# Patient Record
Sex: FEMALE | Race: WHITE
[De-identification: names, ages, dates, MRNs, and addresses within clinical notes are randomized per-mention and may not be internally consistent; named-entity substitution may affect disease eponyms.]

---

## 2017-05-26 NOTE — MH
cc:

ROSLYN OROURKE MD

****

 

DATE OF ADMISSION

2017

 

DATE OF BIRTH

1940

 

REASON FOR ADMISSION

Hysteroscopy, D&C

 

HISTORY OF PRESENT ILLNESS

Patient is a 76-year-old white female  5, para 4 who has had an issue

with breast cancer and was on tamoxifen for three years.  She had an episode of

postmenopausal bleeding and  was found to have a thickened endometrial stripe.

The patient had previous hysteroscopy and polypectomy a few years ago with

benign pathology. Her ultrasound in May of 2017 shows a 9 mm stripe.  The

patient was given options and she has opted for repeat hysteroscopy.

 

PAST MEDICAL HISTORY

1.  Hypertension,

2.  Hypercholesterolemia,

3.  Breast cancer.

4.  History of recurrent bladder infections.

 

PAST SURGICAL HISTORY

1.  Lumpectomy

2.  Knee replacement bilaterally

3.  Hysteroscopy D&C.

 

ALLERGIES

SULFA

 

MEDICATIONS

Bystolic 5 mg daily.

 

GYNECOLOGIC HISTORY

No STDs, abnormal Pap smears.  Bleeding issue as noted above.

 

OBSTETRICAL HISTORY

Four vaginal deliveries.

 

FAMILY HISTORY

Noncontributory.

 

SOCIAL HISTORY

Has good social support.  .  No alcohol or drugs.

 

REVIEW OF SYSTEMS

Negative for chest pain, orthopnea, PND.

 

PHYSICAL EXAMINATION

VITAL SIGNS: She is afebrile.  Vital signs stable. Blood pressure is 120/70,

height is 5 feet 5 inches, weight 145, BMI is 24.  GENERAL:  Patient is alert,

oriented in no acute distress, no sign of cognitive dysfunction or depression.

 

HEENT:  Within normal limits.

NECK: Supple.  No JVD.

CHEST:  Clear.

HEART:  Regular rate and rhythm.

ABDOMEN: Soft, nontender.  No hepatosplenomegaly.  No CVA tenderness.

PELVIC:  Exam will be detailed under anesthesia.

EXTREMITIES:   Normal skin without rashes.

NEUROLOGIC :  Nonfocal.  No DVT signs.

 

ASSESSMENT

Patient with postmenopausal bleeding, thickened endometrial stripe, history of

tamoxifen  use and prior benign polypectomy.

 

The patient  has opted to proceed with repeat hysteroscopy. She is aware of the

risks, benefits and alternatives to planned procedure including damage to

surrounding organs, bleeding, infection, need for further surgery.

 

The patient will use DVT prophylaxis with Ancef 2 grams and she will use DVT

prophylaxis with sequential compression device.  Anticipate outpatient

procedure.

 

 

                               __________________________________

                           ChristophMD SEE Zaidi/

D:  2017/5:03 PM

T:  2017/8:16 AM

Visit #:  [id number]

Job #:  [id number]

## 2017-06-02 ENCOUNTER — HOSPITAL ENCOUNTER (OUTPATIENT)
Dept: HOSPITAL 17 - HSDC | Age: 77
Discharge: HOME | End: 2017-06-02
Attending: OBSTETRICS & GYNECOLOGY
Payer: MEDICARE

## 2017-06-02 VITALS
RESPIRATION RATE: 16 BRPM | TEMPERATURE: 97.4 F | HEART RATE: 53 BPM | SYSTOLIC BLOOD PRESSURE: 143 MMHG | DIASTOLIC BLOOD PRESSURE: 63 MMHG | OXYGEN SATURATION: 99 %

## 2017-06-02 VITALS — BODY MASS INDEX: 24.39 KG/M2 | HEIGHT: 65.5 IN | WEIGHT: 148.15 LBS

## 2017-06-02 VITALS
HEART RATE: 60 BPM | TEMPERATURE: 97.9 F | DIASTOLIC BLOOD PRESSURE: 70 MMHG | OXYGEN SATURATION: 100 % | SYSTOLIC BLOOD PRESSURE: 150 MMHG | RESPIRATION RATE: 18 BRPM

## 2017-06-02 DIAGNOSIS — N84.0: Primary | ICD-10-CM

## 2017-06-02 DIAGNOSIS — Z79.810: ICD-10-CM

## 2017-06-02 DIAGNOSIS — I10: ICD-10-CM

## 2017-06-02 DIAGNOSIS — Z96.653: ICD-10-CM

## 2017-06-02 DIAGNOSIS — Z88.2: ICD-10-CM

## 2017-06-02 DIAGNOSIS — Z85.3: ICD-10-CM

## 2017-06-02 DIAGNOSIS — E78.00: ICD-10-CM

## 2017-06-02 DIAGNOSIS — N95.0: ICD-10-CM

## 2017-06-02 PROCEDURE — 88305 TISSUE EXAM BY PATHOLOGIST: CPT

## 2017-06-03 NOTE — MP
cc:

ROSLYN OROURKE MD

****

 

 

DATE OF SURGERY

6/2/17

 

PREOPERATIVE DIAGNOSES

Abnormal pelvic ultrasound with thickened endometrial stripe and postmenopausal

bleeding.

 

POSTOPERATIVE DIAGNOSIS

Abnormal pelvic ultrasound with thickened endometrial stripe and postmenopausal

bleeding. Endometrial polyp.

 

PROCEDURE

Operative hysteroscopy with removal of 2 cm endometrial polyp.

 

SURGEON

MD Noe

 

ANESTHESIA

Laryngeal mask.

 

BLOOD LOSS

5 cc.

 

URINE OUTPUT

300  prior to case.

 

ASSISTANT

[place] staff x1.

 

FINDINGS

Externa genitalia normal. POP-Q score:  Aa is -1, Ap is -1.  Point C is -3.

Total vaginal length is 10.  Genital hiatus is 8. Perineal body is 4. Uterus

sounds to 8 cm, mobile, anteverted, anteflexed. No adnexal mass.

 

Hysteroscopy shows a sessile 2-cm polyp posterior wall, otherwise, relatively

atrophic endometrium.

 

COMPLICATIONS

None.

 

DISPOSITION

Recovery room stable.

 

COUNTS

Needle, sponge correct.

 

DRAINS

None.

 

SPECIMENS

Endometrial polyp. Endometrial curettage.

 

SUMMARY OF INDICATION PROCEDURE

Patient with history of breast cancer, had been using tamoxifen, had been

worked up for issues with pelvic prolapse and urinary urgency, was found to

have thickened endometrial stripe.  With the patient's history of tamoxifen

used hysteroscopy was recommended with polypectomy.

 

PROCEDURE IN DETAIL

The patient was taken to the operating theater, prepped and draped in fashion

appropriate for planned procedure.  She was in dorsal lithotomy position with

careful attention paid to placement of legs in stirrups to avoid undue stress

to sensitive neurovascular structures.  Above findings noted.  Neurovascular

integrity documented. Bladder was drained.

 

Cervix was grasped with tenaculum.  Cervix required minimal dilation. Uterus

sounds to 8 cm. 5 mm scope was used with normal saline as distension medium.

The above findings noted. Sessile polyp was identified and removed mechanically

with no electric energy.

 

Hemostasis was good.  Curettage was obtained.  Repeat hysteroscopy was

performed showing atrophic endometrium and removal of polyp.

 

Procedure was concluded.  The patient reversed from anesthesia to recovery room

in stable condition.

 

The patient has issues with pelvic prolapse.  She could be reasonable anterior

posterior repair candidate with vaginal hysterectomy.

 

 

                              _________________________________

                              Roslyn Orourke MD

 

 

 

CS/EO

D:  6/2/2017/9:10 AM

T:  6/3/2017/6:13 PM

Visit #:  [id number]

Job #:  [id number]

## 2017-08-31 ENCOUNTER — HOSPITAL ENCOUNTER (OUTPATIENT)
Dept: HOSPITAL 17 - PLAB | Age: 77
End: 2017-08-31
Attending: FAMILY MEDICINE
Payer: MEDICARE

## 2017-08-31 DIAGNOSIS — E34.9: ICD-10-CM

## 2017-08-31 DIAGNOSIS — I10: Primary | ICD-10-CM

## 2017-08-31 LAB
ALP SERPL-CCNC: 74 U/L (ref 45–117)
ALT SERPL-CCNC: 23 U/L (ref 10–53)
ANION GAP SERPL CALC-SCNC: 6 MEQ/L (ref 5–15)
AST SERPL-CCNC: 22 U/L (ref 15–37)
BASOPHILS # BLD AUTO: 0 TH/MM3 (ref 0–0.2)
BASOPHILS NFR BLD: 0.6 % (ref 0–2)
BILIRUB SERPL-MCNC: 0.5 MG/DL (ref 0.2–1)
BUN SERPL-MCNC: 18 MG/DL (ref 7–18)
CHLORIDE SERPL-SCNC: 103 MEQ/L (ref 98–107)
EOSINOPHIL # BLD: 0.2 TH/MM3 (ref 0–0.4)
EOSINOPHIL NFR BLD: 3.3 % (ref 0–4)
ERYTHROCYTE [DISTWIDTH] IN BLOOD BY AUTOMATED COUNT: 13.5 % (ref 11.6–17.2)
GFR SERPLBLD BASED ON 1.73 SQ M-ARVRAT: 71 ML/MIN (ref 89–?)
HCO3 BLD-SCNC: 29.8 MEQ/L (ref 21–32)
HCT VFR BLD CALC: 36.9 % (ref 35–46)
HEMO FLAGS: (no result)
LYMPHOCYTES # BLD AUTO: 2.3 TH/MM3 (ref 1–4.8)
LYMPHOCYTES NFR BLD AUTO: 42.5 % (ref 9–44)
MCH RBC QN AUTO: 31.9 PG (ref 27–34)
MCHC RBC AUTO-ENTMCNC: 33.7 % (ref 32–36)
MCV RBC AUTO: 94.5 FL (ref 80–100)
MONOCYTES NFR BLD: 9.7 % (ref 0–8)
NEUTROPHILS # BLD AUTO: 2.4 TH/MM3 (ref 1.8–7.7)
NEUTROPHILS NFR BLD AUTO: 43.9 % (ref 16–70)
PLATELET # BLD: 271 TH/MM3 (ref 150–450)
POTASSIUM SERPL-SCNC: 4.1 MEQ/L (ref 3.5–5.1)
RBC # BLD AUTO: 3.91 MIL/MM3 (ref 4–5.3)
SODIUM SERPL-SCNC: 139 MEQ/L (ref 136–145)
WBC # BLD AUTO: 5.5 TH/MM3 (ref 4–11)

## 2017-08-31 PROCEDURE — 85025 COMPLETE CBC W/AUTO DIFF WBC: CPT

## 2017-08-31 PROCEDURE — 80053 COMPREHEN METABOLIC PANEL: CPT

## 2017-08-31 PROCEDURE — 36415 COLL VENOUS BLD VENIPUNCTURE: CPT

## 2017-08-31 PROCEDURE — 84443 ASSAY THYROID STIM HORMONE: CPT

## 2018-01-10 ENCOUNTER — HOSPITAL ENCOUNTER (OUTPATIENT)
Dept: HOSPITAL 17 - PLAB | Age: 78
End: 2018-01-10
Attending: ORTHOPAEDIC SURGERY
Payer: MEDICARE

## 2018-01-10 DIAGNOSIS — M25.531: ICD-10-CM

## 2018-01-10 DIAGNOSIS — G56.01: ICD-10-CM

## 2018-01-10 DIAGNOSIS — Z01.812: Primary | ICD-10-CM

## 2018-01-10 LAB
BASOPHILS # BLD AUTO: 0 TH/MM3 (ref 0–0.2)
BASOPHILS NFR BLD: 0.6 % (ref 0–2)
BUN SERPL-MCNC: 21 MG/DL (ref 7–18)
CALCIUM SERPL-MCNC: 8.9 MG/DL (ref 8.5–10.1)
CHLORIDE SERPL-SCNC: 104 MEQ/L (ref 98–107)
CREAT SERPL-MCNC: 0.85 MG/DL (ref 0.5–1)
EOSINOPHIL # BLD: 0.2 TH/MM3 (ref 0–0.4)
EOSINOPHIL NFR BLD: 3.5 % (ref 0–4)
ERYTHROCYTE [DISTWIDTH] IN BLOOD BY AUTOMATED COUNT: 13.7 % (ref 11.6–17.2)
GFR SERPLBLD BASED ON 1.73 SQ M-ARVRAT: 65 ML/MIN (ref 89–?)
GLUCOSE SERPL-MCNC: 87 MG/DL (ref 74–106)
HCO3 BLD-SCNC: 27.5 MEQ/L (ref 21–32)
HCT VFR BLD CALC: 35.2 % (ref 35–46)
HGB BLD-MCNC: 12.1 GM/DL (ref 11.6–15.3)
LYMPHOCYTES # BLD AUTO: 2.1 TH/MM3 (ref 1–4.8)
LYMPHOCYTES NFR BLD AUTO: 39.5 % (ref 9–44)
MCH RBC QN AUTO: 31.9 PG (ref 27–34)
MCHC RBC AUTO-ENTMCNC: 34.4 % (ref 32–36)
MCV RBC AUTO: 92.9 FL (ref 80–100)
MONOCYTE #: 0.6 TH/MM3 (ref 0–0.9)
MONOCYTES NFR BLD: 11.3 % (ref 0–8)
NEUTROPHILS # BLD AUTO: 2.4 TH/MM3 (ref 1.8–7.7)
NEUTROPHILS NFR BLD AUTO: 45.1 % (ref 16–70)
PLATELET # BLD: 284 TH/MM3 (ref 150–450)
PMV BLD AUTO: 7.2 FL (ref 7–11)
RBC # BLD AUTO: 3.79 MIL/MM3 (ref 4–5.3)
SODIUM SERPL-SCNC: 138 MEQ/L (ref 136–145)
WBC # BLD AUTO: 5.2 TH/MM3 (ref 4–11)

## 2018-01-10 PROCEDURE — 80048 BASIC METABOLIC PNL TOTAL CA: CPT

## 2018-01-10 PROCEDURE — 85025 COMPLETE CBC W/AUTO DIFF WBC: CPT

## 2018-01-10 PROCEDURE — 36415 COLL VENOUS BLD VENIPUNCTURE: CPT

## 2018-02-27 ENCOUNTER — HOSPITAL ENCOUNTER (OUTPATIENT)
Dept: HOSPITAL 17 - PLAB | Age: 78
End: 2018-02-27
Attending: FAMILY MEDICINE
Payer: MEDICARE

## 2018-02-27 DIAGNOSIS — R31.29: ICD-10-CM

## 2018-02-27 DIAGNOSIS — E78.5: ICD-10-CM

## 2018-02-27 DIAGNOSIS — I10: Primary | ICD-10-CM

## 2018-02-27 LAB
ALBUMIN SERPL-MCNC: 3.9 GM/DL (ref 3.4–5)
ALP SERPL-CCNC: 73 U/L (ref 45–117)
ALT SERPL-CCNC: 18 U/L (ref 10–53)
AST SERPL-CCNC: 21 U/L (ref 15–37)
BACTERIA #/AREA URNS HPF: (no result) /HPF
BASOPHILS # BLD AUTO: 0 TH/MM3 (ref 0–0.2)
BASOPHILS NFR BLD: 0.5 % (ref 0–2)
BILIRUB SERPL-MCNC: 0.5 MG/DL (ref 0.2–1)
BUN SERPL-MCNC: 21 MG/DL (ref 7–18)
CALCIUM SERPL-MCNC: 9.2 MG/DL (ref 8.5–10.1)
CHLORIDE SERPL-SCNC: 103 MEQ/L (ref 98–107)
CHOLEST SERPL-MCNC: 249 MG/DL (ref 120–200)
CHOLESTEROL/ HDL RATIO: 3.49 RATIO
COLOR UR: (no result)
CREAT SERPL-MCNC: 0.82 MG/DL (ref 0.5–1)
EOSINOPHIL # BLD: 0.2 TH/MM3 (ref 0–0.4)
EOSINOPHIL NFR BLD: 2.7 % (ref 0–4)
ERYTHROCYTE [DISTWIDTH] IN BLOOD BY AUTOMATED COUNT: 13.5 % (ref 11.6–17.2)
GFR SERPLBLD BASED ON 1.73 SQ M-ARVRAT: 67 ML/MIN (ref 89–?)
GLUCOSE UR STRIP-MCNC: (no result) MG/DL
HCO3 BLD-SCNC: 30 MEQ/L (ref 21–32)
HCT VFR BLD CALC: 36.5 % (ref 35–46)
HDLC SERPL-MCNC: 71.3 MG/DL (ref 40–60)
HGB BLD-MCNC: 12.5 GM/DL (ref 11.6–15.3)
HGB UR QL STRIP: (no result)
KETONES UR STRIP-MCNC: (no result) MG/DL
LDLC SERPL DIRECT ASSAY-MCNC: 161 MG/DL (ref 0–99)
LDLC SERPL-MCNC: 147 MG/DL (ref 0–99)
LYMPHOCYTES # BLD AUTO: 2.2 TH/MM3 (ref 1–4.8)
LYMPHOCYTES NFR BLD AUTO: 40.4 % (ref 9–44)
MCH RBC QN AUTO: 32.4 PG (ref 27–34)
MCHC RBC AUTO-ENTMCNC: 34.4 % (ref 32–36)
MCV RBC AUTO: 94.4 FL (ref 80–100)
MONOCYTE #: 0.5 TH/MM3 (ref 0–0.9)
MONOCYTES NFR BLD: 9.4 % (ref 0–8)
MUCOUS THREADS #/AREA URNS LPF: (no result) /LPF
NEUTROPHILS # BLD AUTO: 2.6 TH/MM3 (ref 1.8–7.7)
NEUTROPHILS NFR BLD AUTO: 47 % (ref 16–70)
NITRITE UR QL STRIP: (no result)
PLATELET # BLD: 279 TH/MM3 (ref 150–450)
PMV BLD AUTO: 7.4 FL (ref 7–11)
PROT SERPL-MCNC: 7.3 GM/DL (ref 6.4–8.2)
RBC # BLD AUTO: 3.87 MIL/MM3 (ref 4–5.3)
SODIUM SERPL-SCNC: 138 MEQ/L (ref 136–145)
SP GR UR STRIP: 1.01 (ref 1–1.03)
SQUAMOUS #/AREA URNS HPF: 3 /HPF (ref 0–5)
TRIGL SERPL-MCNC: 155 MG/DL (ref 42–150)
URINE LEUKOCYTE ESTERASE: (no result)
WBC # BLD AUTO: 5.5 TH/MM3 (ref 4–11)

## 2018-02-27 PROCEDURE — 81001 URINALYSIS AUTO W/SCOPE: CPT

## 2018-02-27 PROCEDURE — 85025 COMPLETE CBC W/AUTO DIFF WBC: CPT

## 2018-02-27 PROCEDURE — 83721 ASSAY OF BLOOD LIPOPROTEIN: CPT

## 2018-02-27 PROCEDURE — 87086 URINE CULTURE/COLONY COUNT: CPT

## 2018-02-27 PROCEDURE — 80061 LIPID PANEL: CPT

## 2018-02-27 PROCEDURE — 36415 COLL VENOUS BLD VENIPUNCTURE: CPT

## 2018-02-27 PROCEDURE — 80053 COMPREHEN METABOLIC PANEL: CPT

## 2018-02-27 PROCEDURE — 84443 ASSAY THYROID STIM HORMONE: CPT

## 2018-03-15 ENCOUNTER — HOSPITAL ENCOUNTER (OUTPATIENT)
Dept: HOSPITAL 17 - CPRE | Age: 78
End: 2018-03-15
Attending: OBSTETRICS & GYNECOLOGY
Payer: MEDICARE

## 2018-03-15 DIAGNOSIS — Z01.810: Primary | ICD-10-CM

## 2018-03-15 DIAGNOSIS — Z01.811: ICD-10-CM

## 2018-03-15 DIAGNOSIS — Z01.812: ICD-10-CM

## 2018-03-15 DIAGNOSIS — Z01.818: ICD-10-CM

## 2018-03-15 DIAGNOSIS — N85.00: ICD-10-CM

## 2018-03-15 DIAGNOSIS — N95.0: ICD-10-CM

## 2018-03-15 LAB
ALBUMIN SERPL-MCNC: 4 GM/DL (ref 3.4–5)
ALP SERPL-CCNC: 77 U/L (ref 45–117)
ALT SERPL-CCNC: 20 U/L (ref 10–53)
AST SERPL-CCNC: 22 U/L (ref 15–37)
BASOPHILS # BLD AUTO: 0 TH/MM3 (ref 0–0.2)
BASOPHILS NFR BLD: 0.5 % (ref 0–2)
BILIRUB SERPL-MCNC: 0.5 MG/DL (ref 0.2–1)
BUN SERPL-MCNC: 20 MG/DL (ref 7–18)
CALCIUM SERPL-MCNC: 9.5 MG/DL (ref 8.5–10.1)
CHLORIDE SERPL-SCNC: 103 MEQ/L (ref 98–107)
CREAT SERPL-MCNC: 0.84 MG/DL (ref 0.5–1)
EOSINOPHIL # BLD: 0.2 TH/MM3 (ref 0–0.4)
EOSINOPHIL NFR BLD: 2.8 % (ref 0–4)
ERYTHROCYTE [DISTWIDTH] IN BLOOD BY AUTOMATED COUNT: 13.4 % (ref 11.6–17.2)
GFR SERPLBLD BASED ON 1.73 SQ M-ARVRAT: 65 ML/MIN (ref 89–?)
HCO3 BLD-SCNC: 27.3 MEQ/L (ref 21–32)
HCT VFR BLD CALC: 35.7 % (ref 35–46)
HGB BLD-MCNC: 12.3 GM/DL (ref 11.6–15.3)
INR PPP: 1 RATIO
LYMPHOCYTES # BLD AUTO: 2 TH/MM3 (ref 1–4.8)
LYMPHOCYTES NFR BLD AUTO: 36.3 % (ref 9–44)
MCH RBC QN AUTO: 32 PG (ref 27–34)
MCHC RBC AUTO-ENTMCNC: 34.3 % (ref 32–36)
MCV RBC AUTO: 93.4 FL (ref 80–100)
MONOCYTE #: 0.6 TH/MM3 (ref 0–0.9)
MONOCYTES NFR BLD: 10.6 % (ref 0–8)
NEUTROPHILS # BLD AUTO: 2.8 TH/MM3 (ref 1.8–7.7)
NEUTROPHILS NFR BLD AUTO: 49.8 % (ref 16–70)
PLATELET # BLD: 282 TH/MM3 (ref 150–450)
PMV BLD AUTO: 7.1 FL (ref 7–11)
PROT SERPL-MCNC: 7.1 GM/DL (ref 6.4–8.2)
PROTHROMBIN TIME: 10.1 SEC (ref 9.8–11.6)
RBC # BLD AUTO: 3.83 MIL/MM3 (ref 4–5.3)
SODIUM SERPL-SCNC: 140 MEQ/L (ref 136–145)
WBC # BLD AUTO: 5.6 TH/MM3 (ref 4–11)

## 2018-03-15 PROCEDURE — 36415 COLL VENOUS BLD VENIPUNCTURE: CPT

## 2018-03-15 PROCEDURE — 85025 COMPLETE CBC W/AUTO DIFF WBC: CPT

## 2018-03-15 PROCEDURE — 80053 COMPREHEN METABOLIC PANEL: CPT

## 2018-03-15 PROCEDURE — 71046 X-RAY EXAM CHEST 2 VIEWS: CPT

## 2018-03-15 PROCEDURE — 85610 PROTHROMBIN TIME: CPT

## 2018-03-15 PROCEDURE — 85730 THROMBOPLASTIN TIME PARTIAL: CPT

## 2018-03-15 NOTE — RADRPT
EXAM DATE/TIME:  03/15/2018 11:55 

 

[facility] COMPARISON:     

No previous studies available for comparison.

 

                     

INDICATIONS :     

Evaluate for pneumonia, pneumothorax and communicable diseases. Pre-op hysterectomy.

                     

 

MEDICAL HISTORY :     

Carcinoma, breast.  Arthritis.     Anemia   

 

SURGICAL HISTORY :        

Left breast lumpectomy

 

ENCOUNTER:     

Initial                                        

 

ACUITY:     

1 day      

 

PAIN SCORE:     

0/10

 

LOCATION:     

Bilateral chest 

 

FINDINGS:     

PA and lateral views of the chest demonstrate the lungs to be symmetrically aerated without evidence 
of mass, infiltrate or effusion.  Calcified granuloma right upper lobe. Clips overlying the left ches
t. The cardiomediastinal contours are unremarkable.  Osseous structures are intact.

 

CONCLUSION:     No acute disease.  

 

 

 

 Hira Umaña MD on March 15, 2018 at 12:06           

Board Certified Radiologist.

 This report was verified electronically.

## 2018-03-29 ENCOUNTER — HOSPITAL ENCOUNTER (OUTPATIENT)
Dept: HOSPITAL 17 - HSDC | Age: 78
Setting detail: OBSERVATION
LOS: 1 days | Discharge: HOME | End: 2018-03-30
Attending: OBSTETRICS & GYNECOLOGY | Admitting: OBSTETRICS & GYNECOLOGY
Payer: MEDICARE

## 2018-03-29 VITALS — BODY MASS INDEX: 25.6 KG/M2 | WEIGHT: 153.66 LBS | HEIGHT: 65 IN

## 2018-03-29 VITALS
DIASTOLIC BLOOD PRESSURE: 65 MMHG | HEART RATE: 72 BPM | TEMPERATURE: 97.5 F | RESPIRATION RATE: 18 BRPM | SYSTOLIC BLOOD PRESSURE: 158 MMHG | OXYGEN SATURATION: 99 %

## 2018-03-29 VITALS
HEART RATE: 66 BPM | TEMPERATURE: 98.2 F | RESPIRATION RATE: 18 BRPM | OXYGEN SATURATION: 99 % | SYSTOLIC BLOOD PRESSURE: 164 MMHG | DIASTOLIC BLOOD PRESSURE: 68 MMHG

## 2018-03-29 DIAGNOSIS — D25.9: ICD-10-CM

## 2018-03-29 DIAGNOSIS — N95.0: Primary | ICD-10-CM

## 2018-03-29 DIAGNOSIS — K57.30: ICD-10-CM

## 2018-03-29 DIAGNOSIS — N88.8: ICD-10-CM

## 2018-03-29 DIAGNOSIS — Z79.82: ICD-10-CM

## 2018-03-29 DIAGNOSIS — Z85.3: ICD-10-CM

## 2018-03-29 DIAGNOSIS — N73.6: ICD-10-CM

## 2018-03-29 DIAGNOSIS — M19.90: ICD-10-CM

## 2018-03-29 DIAGNOSIS — N84.0: ICD-10-CM

## 2018-03-29 PROCEDURE — 85025 COMPLETE CBC W/AUTO DIFF WBC: CPT

## 2018-03-29 PROCEDURE — 86850 RBC ANTIBODY SCREEN: CPT

## 2018-03-29 PROCEDURE — 86900 BLOOD TYPING SEROLOGIC ABO: CPT

## 2018-03-29 PROCEDURE — 88112 CYTOPATH CELL ENHANCE TECH: CPT

## 2018-03-29 PROCEDURE — 86901 BLOOD TYPING SEROLOGIC RH(D): CPT

## 2018-03-29 PROCEDURE — 80048 BASIC METABOLIC PNL TOTAL CA: CPT

## 2018-03-29 PROCEDURE — G0378 HOSPITAL OBSERVATION PER HR: HCPCS

## 2018-03-29 PROCEDURE — 88307 TISSUE EXAM BY PATHOLOGIST: CPT

## 2018-03-29 PROCEDURE — 00840 ANES IPER PX LOWER ABD NOS: CPT

## 2018-03-29 PROCEDURE — 88329 PATH CONSLTJ DRG SURG: CPT

## 2018-03-29 PROCEDURE — 58552 LAPARO-VAG HYST INCL T/O: CPT

## 2018-03-29 RX ADMIN — CLONIDINE HYDROCHLORIDE SCH MG: 0.1 INJECTION, SOLUTION EPIDURAL at 22:00

## 2018-03-29 RX ADMIN — OXYCODONE HYDROCHLORIDE AND ACETAMINOPHEN PRN TAB: 5; 325 TABLET ORAL at 18:15

## 2018-03-29 RX ADMIN — POTASSIUM CHLORIDE, DEXTROSE MONOHYDRATE AND SODIUM CHLORIDE SCH MLS/HR: 150; 5; 450 INJECTION, SOLUTION INTRAVENOUS at 16:00

## 2018-03-30 VITALS
SYSTOLIC BLOOD PRESSURE: 137 MMHG | RESPIRATION RATE: 18 BRPM | OXYGEN SATURATION: 99 % | TEMPERATURE: 98.6 F | DIASTOLIC BLOOD PRESSURE: 53 MMHG | HEART RATE: 65 BPM

## 2018-03-30 VITALS
RESPIRATION RATE: 18 BRPM | SYSTOLIC BLOOD PRESSURE: 150 MMHG | HEART RATE: 62 BPM | OXYGEN SATURATION: 97 % | TEMPERATURE: 98 F | DIASTOLIC BLOOD PRESSURE: 59 MMHG

## 2018-03-30 VITALS
OXYGEN SATURATION: 97 % | SYSTOLIC BLOOD PRESSURE: 131 MMHG | HEART RATE: 78 BPM | TEMPERATURE: 98.4 F | DIASTOLIC BLOOD PRESSURE: 49 MMHG | RESPIRATION RATE: 18 BRPM

## 2018-03-30 VITALS — RESPIRATION RATE: 16 BRPM

## 2018-03-30 LAB
BASOPHILS # BLD AUTO: 0 TH/MM3 (ref 0–0.2)
BASOPHILS NFR BLD: 0.2 % (ref 0–2)
BUN SERPL-MCNC: 11 MG/DL (ref 7–18)
CALCIUM SERPL-MCNC: 7.6 MG/DL (ref 8.5–10.1)
CHLORIDE SERPL-SCNC: 100 MEQ/L (ref 98–107)
CREAT SERPL-MCNC: 0.67 MG/DL (ref 0.5–1)
EOSINOPHIL # BLD: 0 TH/MM3 (ref 0–0.4)
EOSINOPHIL NFR BLD: 0.1 % (ref 0–4)
ERYTHROCYTE [DISTWIDTH] IN BLOOD BY AUTOMATED COUNT: 13.1 % (ref 11.6–17.2)
GFR SERPLBLD BASED ON 1.73 SQ M-ARVRAT: 85 ML/MIN (ref 89–?)
GLUCOSE SERPL-MCNC: 167 MG/DL (ref 74–106)
HCO3 BLD-SCNC: 26.4 MEQ/L (ref 21–32)
HCT VFR BLD CALC: 32.1 % (ref 35–46)
HGB BLD-MCNC: 10.9 GM/DL (ref 11.6–15.3)
LYMPHOCYTES # BLD AUTO: 1.7 TH/MM3 (ref 1–4.8)
LYMPHOCYTES NFR BLD AUTO: 12.8 % (ref 9–44)
MCH RBC QN AUTO: 31.9 PG (ref 27–34)
MCHC RBC AUTO-ENTMCNC: 34 % (ref 32–36)
MCV RBC AUTO: 93.7 FL (ref 80–100)
MONOCYTE #: 1.1 TH/MM3 (ref 0–0.9)
MONOCYTES NFR BLD: 8.4 % (ref 0–8)
NEUTROPHILS # BLD AUTO: 10.4 TH/MM3 (ref 1.8–7.7)
NEUTROPHILS NFR BLD AUTO: 78.5 % (ref 16–70)
PLATELET # BLD: 244 TH/MM3 (ref 150–450)
PMV BLD AUTO: 7.5 FL (ref 7–11)
RBC # BLD AUTO: 3.42 MIL/MM3 (ref 4–5.3)
SODIUM SERPL-SCNC: 134 MEQ/L (ref 136–145)
WBC # BLD AUTO: 13.2 TH/MM3 (ref 4–11)

## 2018-03-30 RX ADMIN — OXYCODONE HYDROCHLORIDE AND ACETAMINOPHEN PRN TAB: 5; 325 TABLET ORAL at 09:38

## 2018-03-30 RX ADMIN — CLONIDINE HYDROCHLORIDE SCH MG: 0.1 INJECTION, SOLUTION EPIDURAL at 04:27

## 2018-03-30 RX ADMIN — POTASSIUM CHLORIDE, DEXTROSE MONOHYDRATE AND SODIUM CHLORIDE SCH MLS/HR: 150; 5; 450 INJECTION, SOLUTION INTRAVENOUS at 01:02

## 2018-03-30 NOTE — MD
cc:

Luciana Sosa MD, Christopher J MD Billmeier,Gonzalo DEJESUS MD

****

 

 

DATE OF DISCHARGE:

03/30/2018

 

PROCEDURE:

03/29/2018.

 

PROCEDURE PERFORMED:

Robotic-assisted laparoscopic hysterectomy, bilateral 

salpingo-oophorectomy, lysis of adhesions.

 

DIAGNOSIS:

Thickened endometrial stripe, postmenopausal bleeding, endometrial 

polyp.

 

HOSPITAL COURSE:

She remained hemodynamically stable, did well in the early postop 

period tolerating oral intake.  Roblero catheter was left indwelling due

to some dissection near the bladder.

 

Objective ins and outs 1600/1650.

 

LABS:

H and H this morning 10.9 and 32.1.

 

Electrolytes, BUN and creatinine 11 and 0.67.

 

PHYSICAL EXAMINATION:

VITAL SIGNS:  Afebrile, pulse 60-78, respirations 16-18, blood 

pressure 131-164/49-71, O2 saturations greater than or equal to 97%.

GENERAL:  Alert and oriented x 3, no acute distress.

LUNGS:  Clear.  Mild basilar rales.

CARDIOVASCULAR:  Regular rate and rhythm.

ABDOMEN:  Soft.  Incision is clean and dry.

GYNECOLOGIC:  No bleeding.

EXTREMITIES:  Nontender.

 

ASSESSMENT:

Postoperative day #1, doing well in the early postop period.  The 

preliminary pathology findings at the of surgery, steps taken were 

discussed and reviewed.  Questions were asked and answered.  

Activities and restrictions included in our discussion.  She expressed

a good understanding.

 

PLAN:

Anticipate she will meet criteria for discharge to home today.  She is

to contact the office to followup in 1 week.  She is going to have an 

indwelling Roblero due to some oversewing adjacent to the bladder to 

help facilitate healing.  She will be on Macrodantin once daily as a 

urinary tract prophylaxis.  She will have a prescription for Percocet.

 She is to resume prior medications and she is to call our office to 

ensure that she has a followup in 1 week or to contact us sooner 

should she have any questions or problems.

 

 

 

 

__________________________________

Lcuiana Sosa MD

 

 

KLM/DL

D: 03/30/2018, 07:31 AM

T: 03/30/2018, 07:51 AM

Visit #: [id number]

Job #: [id number]

## 2018-03-30 NOTE — MP
cc:

Luciana Sosa MD, Christopher J MD Billmeier,Gonzalo DEJESUS MD

****

 

 

DATE OF OPERATION:

03/29/2018

 

PREOPERATIVE DIAGNOSIS:

1.  Postmenopausal bleeding.

2.  Thickened endometrial stripe.

 

POSTOPERATIVE DIAGNOSES:

1.  Postmenopausal bleeding.

2.  Thickened endometrial stripe.

3.  Endometrial polyp and pelvic adhesions and a significant 

diverticulum without active diverticulitis.

 

PROCEDURE PERFORMED:

Laparoscopic hysterectomy, bilateral salpingo-oophorectomy, lysis of 

adhesions.

 

SURGEON:

Luciana Sosa MD

 

FIRST ASSISTANT:

Roney first assistant.

 

ANESTHESIA:

General endotracheal anesthesia.

 

ESTIMATED BLOOD LOSS:

75 mL.

 

URINE OUTPUT:

100 mL.

 

IV FLUIDS:

1400 mL.

 

INDICATIONS:

This is a 77-year-old female with a history of breast cancer who had 

been on tamoxifen, thickened endometrial stripe.  Prior sampling 

showed benign tissue;  however, she continued to have some 

intermittent postmenopausal bleeding.  The stripe remained thickened. 

She had concerns regarding the possibility of malignancy and was 

troubled by the symptoms and wanted definitive management.

 

She was counseled in our office and seen again in the preoperative 

holding area where we reviewed the findings and recommendations.  The 

plan of care was again discussed.  She expressed a good understanding 

and would like to move forward with surgery.

 

FINDINGS:

The cervix was circumferentially prominent, but otherwise normal.  

Recent Pap smear was normal.  The uterus sounded to 8 cm symmetrically

slightly prominent, but otherwise normal.  Tubes and ovaries grossly 

appeared normal.

 

There were no appreciably enlarged retroperitoneal lymph nodes.  There

were no peritoneal implants.  There was significant diverticulum in 

the descending colon, most noted in the sigmoid colon with some 

adjacent adhesions against the left pelvic sidewall, left posterior 

cul-de-sac and in the region of the bladder.  There was a prominent 

posterior cul-de-sac with loops of small bowel suggestive of a chronic

enterocele without adhesions to the small bowel.

 

Preliminary pathology shows what appears to be a small benign 

endometrial polyp.  No other significant findings detected.

 

PROCEDURE:

She was taken to the operating room and placed in dorsal lithotomy 

position after general endotracheal anesthesia was administered.  

Time-out was undertaken.  She was identified by site recognition and 

hospital ID bracelet and the proposed procedure was reviewed and 

confirmed.

 

She was carefully positioned in padded Andrew stirrups.  Her arms were 

padded and secured to the sides.  She was further secured to the 

operating table with egg crate padding and tape in a cross chest, over

the shoulder fashion.  All sites noted to be properly aligned with no 

malalignment or pressure points.

 

She was prepped and draped in sterile fashion, placed in lithotomy 

position.  The cervix was grasped.  Uterine cavity was sounded.  

Cervix dilated and a large VCare manipulator inserted and secured in 

usual fashion.  Roblero catheter placed in the bladder.  She was 

returned to the low lithotomy position.  We confirmed that an 

orogastric tube was in the stomach on suction.  A change of sterile 

gloves had been undertaken and we completed draping in anticipation of

laparoscopy.

 

Manual elevation of the abdominal wall and direct laparoscopic 

visualization, a 5 mm cannula introduced in the left upper quadrant.  

An atraumatic entry was confirmed.  Carbon dioxide gas was 

insufflated.  A 12 mm cannula placed in the midline above the 

umbilicus, 8 mm cannula placed in the right upper quadrant, left 

lateral quadrant and the original 5 exchanged for an 8 mm cannula.

 

She was placed in Trendelenburg position.  Peritoneal washings were 

obtained for cytology.  The anatomy was surveyed with findings as 

described above.  The small bowel loops were retracted from the 

pelvis, rolled back on the mesenteric root into the abdomen and three 

Ray-Lola sponges were placed across the root of the small bowel 

mesentery.

 

The robotic system brought into the operative field, attached in the 

usual fashion.  Robotic system attached in the usual fashion.  

Monopolar scissors, fenestrated bipolar forceps and a ProGrasp 

manipulator placed in arms 1, 2 and 3 respectively.  I took my place 

at the surgeon's console.

 

Initial adhesiolysis was started taking down the adhesions from the 

cul-de-sac, mobilizing the colon from its attachments to the left 

pelvic sidewall to gain access to the retroperitoneal structures.

 

The right round ligament was isolated, cauterized and transected.  The

anterior and posterior leaves of the broad ligament were opened.  The 

right ureter was identified.  The right infundibulopelvic ligament was

isolated to the level of the pelvic brim where it was cauterized and 

transected.

 

The posterior peritoneum opened along the right side of the uterus and

cervix and the right vesicouterine peritoneum was dissected off the 

lower uterine segment and cervix.  Some additional scar tissue was 

taken down with sharp dissection.  The right uterine vessels were 

skeletonized, cauterized and transected as were the cardinal, 

paracervical and uterosacral ligaments thereby freeing the attachments

along the right side of the uterus and cervix.

 

Additional lysis of adhesion was carried out on the left side.  The 

left round ligament was isolated, cauterized and transected.  The 

anterior and posterior leafs of the broad ligament were opened.  Left 

ureter was identified.  Left infundibulopelvic ligament was isolated, 

it was isolated to the level of the pelvic brim where it was 

cauterized and transected.

 

Posterior peritoneum opened along the left side of the uterus and 

cervix and the left vesicouterine peritoneum was dissected off the 

lower uterine segment and cervix.  The left uterine vessels were 

skeletonized, cauterized and transected as were the cardinal , 

paracervical and uterosacral ligament, thereby freeing the attachments

along the left side of the uterus and cervix.

 

Circumferential colpotomy was performed.  Specimen was withdrawn 

transvaginally which included uterus, cervix, tubes and ovaries.  A 

pneumo-occluder balloon was placed in the vagina to maintain 

pneumoperitoneum.

 

Instruments 1 and 3 exchanged for needle drivers as a 0 Vicryl suture 

was introduced.  The vaginal cuff was closed starting at the left 

corner full thickness closure incorporating the uterosacral ligament 

and posterior peritoneum, tied via instrument tie and a running 

continuous closure was continued across the vaginal apex toward the 

contralateral corner where it was similarly fixed, secured and tied.  

The suture was cut and the needle was removed.  An additional 

figure-of-eight supporting suture incorporating the uterosacral 

ligament on the right side was also placed to help facilitate 

anatomical support, tied the instrument tie, the needle was cut and 

removed.  The pelvis was thoroughly irrigated.  Small bleeders 

rendered hemostatic with bipolar cautery.  All sites satisfactorily 

hemostatic.

 

The bladder was filled with saline dyed with methylene blue.  There 

was a nice margin between the bladder edge and the vaginal cuff suture

line.  Good peristalsis of ureters bilaterally.  The bladder was 

intact.  No areas of thinning of the bladder.  No blue visibility, 

certainly no extravasation of dye;  however, some perivesical adipose 

tissue and peritoneum were disrupted in the dissection and taken down 

as scar tissue.  Accordingly, 3-0 Vicryl suture was introduced.  

Interrupted 3-0 Vicryl figure-of-eight sutures were used to 

reapproximate this tissue to lend further support and maximize healing

and the bladder was drained after continued confirmation of an intact 

bladder.  The needle was cut.

 

The pelvis was again thoroughly irrigated.  The pathology came back 

showing what appeared to be a small benign polyp from the endometrium.

 Therefore, it was felt that all reasonable surgical objectives had 

been completed.

 

The instruments were removed.  The robotic system was disengaged from 

the operative field.  I reentered the bedside under sterile condition.

 Each of the 3 Ray-Lola sponges that had been placed were removed.  

Each were inspected and noted to be removed in their entirety and 

there were no remaining foreign objects in the peritoneal cavity.  

Preliminary counts were correct.

 

0 Vicryl suture was used with a needle pass a closure apparatus to 

close the 12 mm fascial defect.  It was tied securely which rendered 

the fascia completely airtight and hemostatic.  The remaining cannulas

were withdrawn.  Carbon dioxide gas was removed.  3-0 Vicryl, 

subcutaneous 2-0 Vicryl subcuticular and Steri-Strips were used to 

close these incisions.

 

She was returned to dorsal lithotomy position.  Pelvic exam confirmed 

the vaginal cuff to be well supported.  No vaginal lacerations.  No 

remaining foreign objects in the vagina.  Final counts were correct.  

 

She was returned to dorsal supine position and was pending reversal of

anesthesia when I left the operating room to proceed her to the 

Postanesthesia Care Unit.

 

 

 

 

__________________________________

MD AYDE Rvias/KARL

D: 03/30/2018, 07:45 AM

T: 03/30/2018, 08:29 AM

Visit #: [id number]

Job #: [id number]

## 2018-04-08 ENCOUNTER — HOSPITAL ENCOUNTER (EMERGENCY)
Dept: HOSPITAL 17 - NEPE | Age: 78
Discharge: HOME | End: 2018-04-08
Payer: MEDICARE

## 2018-04-08 VITALS — SYSTOLIC BLOOD PRESSURE: 175 MMHG | DIASTOLIC BLOOD PRESSURE: 72 MMHG

## 2018-04-08 VITALS
RESPIRATION RATE: 14 BRPM | SYSTOLIC BLOOD PRESSURE: 150 MMHG | DIASTOLIC BLOOD PRESSURE: 65 MMHG | OXYGEN SATURATION: 97 % | TEMPERATURE: 98.7 F | HEART RATE: 79 BPM

## 2018-04-08 VITALS
HEART RATE: 83 BPM | RESPIRATION RATE: 18 BRPM | SYSTOLIC BLOOD PRESSURE: 155 MMHG | TEMPERATURE: 100.9 F | OXYGEN SATURATION: 95 % | DIASTOLIC BLOOD PRESSURE: 70 MMHG

## 2018-04-08 VITALS — HEIGHT: 65 IN | BODY MASS INDEX: 24.98 KG/M2 | WEIGHT: 149.91 LBS

## 2018-04-08 VITALS — OXYGEN SATURATION: 96 %

## 2018-04-08 DIAGNOSIS — N39.0: Primary | ICD-10-CM

## 2018-04-08 DIAGNOSIS — Z85.3: ICD-10-CM

## 2018-04-08 DIAGNOSIS — Z90.710: ICD-10-CM

## 2018-04-08 LAB
ALBUMIN SERPL-MCNC: 3.3 GM/DL (ref 3.4–5)
ALP SERPL-CCNC: 161 U/L (ref 45–117)
ALT SERPL-CCNC: 58 U/L (ref 10–53)
AMORPHOUS SEDIMENT, URINE: (no result)
AST SERPL-CCNC: 47 U/L (ref 15–37)
BACTERIA #/AREA URNS HPF: (no result) /HPF
BASOPHILS # BLD AUTO: 0 TH/MM3 (ref 0–0.2)
BASOPHILS NFR BLD: 0.2 % (ref 0–2)
BILIRUB SERPL-MCNC: 0.5 MG/DL (ref 0.2–1)
BUN SERPL-MCNC: 13 MG/DL (ref 7–18)
CALCIUM SERPL-MCNC: 9.3 MG/DL (ref 8.5–10.1)
CHLORIDE SERPL-SCNC: 96 MEQ/L (ref 98–107)
COLOR UR: (no result)
CREAT SERPL-MCNC: 0.82 MG/DL (ref 0.5–1)
EOSINOPHIL # BLD: 0.4 TH/MM3 (ref 0–0.4)
EOSINOPHIL NFR BLD: 2.5 % (ref 0–4)
ERYTHROCYTE [DISTWIDTH] IN BLOOD BY AUTOMATED COUNT: 13.2 % (ref 11.6–17.2)
GFR SERPLBLD BASED ON 1.73 SQ M-ARVRAT: 67 ML/MIN (ref 89–?)
GLUCOSE SERPL-MCNC: 107 MG/DL (ref 74–106)
GLUCOSE UR STRIP-MCNC: (no result) MG/DL
HCO3 BLD-SCNC: 29.9 MEQ/L (ref 21–32)
HCT VFR BLD CALC: 34.1 % (ref 35–46)
HGB BLD-MCNC: 11.6 GM/DL (ref 11.6–15.3)
HGB UR QL STRIP: (no result)
KETONES UR STRIP-MCNC: (no result) MG/DL
LYMPHOCYTES # BLD AUTO: 1.4 TH/MM3 (ref 1–4.8)
LYMPHOCYTES NFR BLD AUTO: 8.3 % (ref 9–44)
MCH RBC QN AUTO: 31.8 PG (ref 27–34)
MCHC RBC AUTO-ENTMCNC: 34.1 % (ref 32–36)
MCV RBC AUTO: 93.3 FL (ref 80–100)
MONOCYTE #: 1.4 TH/MM3 (ref 0–0.9)
MONOCYTES NFR BLD: 8.3 % (ref 0–8)
MUCOUS THREADS #/AREA URNS LPF: (no result) /LPF
NEUTROPHILS # BLD AUTO: 13.3 TH/MM3 (ref 1.8–7.7)
NEUTROPHILS NFR BLD AUTO: 80.7 % (ref 16–70)
NITRITE UR QL STRIP: (no result)
PLATELET # BLD: 371 TH/MM3 (ref 150–450)
PMV BLD AUTO: 7.2 FL (ref 7–11)
PROT SERPL-MCNC: 7.8 GM/DL (ref 6.4–8.2)
RBC # BLD AUTO: 3.65 MIL/MM3 (ref 4–5.3)
SODIUM SERPL-SCNC: 134 MEQ/L (ref 136–145)
SP GR UR STRIP: 1.01 (ref 1–1.03)
SQUAMOUS #/AREA URNS HPF: 11 /HPF (ref 0–5)
URINE LEUKOCYTE ESTERASE: (no result)
WBC # BLD AUTO: 16.4 TH/MM3 (ref 4–11)
WHITE BLOOD CELL CLUMPS: (no result)

## 2018-04-08 PROCEDURE — 81001 URINALYSIS AUTO W/SCOPE: CPT

## 2018-04-08 PROCEDURE — 87040 BLOOD CULTURE FOR BACTERIA: CPT

## 2018-04-08 PROCEDURE — 99284 EMERGENCY DEPT VISIT MOD MDM: CPT

## 2018-04-08 PROCEDURE — 83605 ASSAY OF LACTIC ACID: CPT

## 2018-04-08 PROCEDURE — 85025 COMPLETE CBC W/AUTO DIFF WBC: CPT

## 2018-04-08 PROCEDURE — 87205 SMEAR GRAM STAIN: CPT

## 2018-04-08 PROCEDURE — 80053 COMPREHEN METABOLIC PANEL: CPT

## 2018-04-08 PROCEDURE — 96365 THER/PROPH/DIAG IV INF INIT: CPT

## 2018-04-08 PROCEDURE — 87186 SC STD MICRODIL/AGAR DIL: CPT

## 2018-04-08 PROCEDURE — 87077 CULTURE AEROBIC IDENTIFY: CPT

## 2018-04-08 PROCEDURE — 87804 INFLUENZA ASSAY W/OPTIC: CPT

## 2018-04-08 PROCEDURE — 71045 X-RAY EXAM CHEST 1 VIEW: CPT

## 2018-04-08 PROCEDURE — 87086 URINE CULTURE/COLONY COUNT: CPT

## 2018-04-08 NOTE — PD
HPI


Chief Complaint:  Fever


Time Seen by Provider:  17:22


Travel History


International Travel<30 days:  No


Contact w/Intl Traveler<30days:  No


Traveled to known affect area:  No





History of Present Illness


HPI


77-year-old woman, presents emerged from complaining of fever.  Had a 

hysterectomy done about 10 days ago with Dr. Sosa.  Straining fevers 

yesterday.  A little bit of sore throat but not much cough.  No bit of chest 

tightness and nausea.  She otherwise had been feeling generally well and 

healthy.  Had the hysterectomy for postmenopausal bleeding.  She does get 

frequent urinary tract infections.  She has a history of breast cancer was on 

estrogen antagonist.  She is otherwise been feeling generally well and healthy.

  No other complaints.





History


Past Medical History


*** Narrative Medical


History of breast CA in the past


History of post menopausal bleeding status post hysterectomy


Frequent UTIs


Menopausal:  Yes





Social History


Alcohol Use:  Yes (occassionally )


Tobacco Use:  No





Allergies-Medications


(Allergen,Severity, Reaction):  


Coded Allergies:  


     Sulfa (Sulfonamide Antibiotics) (Verified  Adverse Reaction, Severe, 

NAUSEA AND VOMITING, 3/29/18)


Reported Meds & Prescriptions





Reported Meds & Active Scripts


Active


Oxycodone-Acetaminophen 5-325 (Oxycodone HCl/Acetaminophen) 5 Mg-325 Mg Tablet 

1 Tab PO Q4H PRN


Reported


Multiple Vitamin 1 Tab 1 Tab PO DAILY


Temazepam 7.5 Mg Cap 7.5 Mg PO HS PRN


Zyrtec (Cetirizine HCl) 10 Mg Tablet 10 Mg PO DAILY


Restasis Opth (Cyclosporine Opth) 0.05% Emul 1 Drop EACH EYE BID


Flonase Nasal Spray (Fluticasone Nasal Spray) 50 Mcg/Act Spray 50 Mcg EACH NARE 

BID PRN


Bystolic (Nebivolol) 5 Mg Tab 5 Mg PO DAILY








Review of Systems


Except as stated in HPI:  all other systems reviewed are Neg





Physical Exam


Narrative


GENERAL: Well-appearing 77-year-old woman, no acute distress.


SKIN: Focused skin assessment warm/dry.


HEAD: Atraumatic. Normocephalic. 


EYES: Pupils equal and round. No scleral icterus. No injection or drainage. 


ENT: No nasal bleeding or discharge.  Mucous membranes pink and moist.


NECK: Trachea midline. No JVD. 


CARDIOVASCULAR: Regular rate and rhythm.  No murmur appreciated.


RESPIRATORY: No accessory muscle use. Clear to auscultation. Breath sounds 

equal bilaterally. 


GASTROINTESTINAL: Abdomen soft, non-tender, nondistended. Hepatic and splenic 

margins not palpable.  Incisions are healing well.  There is no erythema 

redness purulent drainage or tenderness.


MUSCULOSKELETAL: No obvious deformities. No clubbing.  No cyanosis.  No edema. 


NEUROLOGICAL: Awake and alert. No obvious cranial nerve deficits.  Motor 

grossly within normal limits. Normal speech.


PSYCHIATRIC: Appropriate mood and affect; insight and judgment normal.





Data


Data


Last Documented VS





Vital Signs








  Date Time  Temp Pulse Resp B/P (MAP) Pulse Ox O2 Delivery O2 Flow Rate FiO2


 


4/8/18 17:34 98.7 79 14 150/65 (93) 97 Room Air  








Orders





 Orders


Sepsis Workup Initiated (4/8/18 )


Complete Blood Count With Diff (4/8/18 17:22)


Comprehensive Metabolic Panel (4/8/18 17:22)


Lactic Acid Sepsis Protocol (4/8/18 17:22)


Urinalysis - C+S If Indicated (4/8/18 17:22)


Blood Culture (4/8/18 17:22)


Ecg Monitoring (4/8/18 17:22)


Iv Access Insert/Monitor (4/8/18 17:22)


Oximetry (4/8/18 17:22)


Oxygen Administration (4/8/18 17:22)


Influenzae A/B Antigen (4/8/18 17:52)


Chest, Single Ap (4/8/18 )


Urine Culture (4/8/18 17:38)


Ceftriaxone Inj (Rocephin Inj) (4/8/18 18:45)





Labs





Laboratory Tests








Test


  4/8/18


17:35 4/8/18


17:38


 


White Blood Count 16.4 TH/MM3  


 


Red Blood Count 3.65 MIL/MM3  


 


Hemoglobin 11.6 GM/DL  


 


Hematocrit 34.1 %  


 


Mean Corpuscular Volume 93.3 FL  


 


Mean Corpuscular Hemoglobin 31.8 PG  


 


Mean Corpuscular Hemoglobin


Concent 34.1 % 


  


 


 


Red Cell Distribution Width 13.2 %  


 


Platelet Count 371 TH/MM3  


 


Mean Platelet Volume 7.2 FL  


 


Neutrophils (%) (Auto) 80.7 %  


 


Lymphocytes (%) (Auto) 8.3 %  


 


Monocytes (%) (Auto) 8.3 %  


 


Eosinophils (%) (Auto) 2.5 %  


 


Basophils (%) (Auto) 0.2 %  


 


Neutrophils # (Auto) 13.3 TH/MM3  


 


Lymphocytes # (Auto) 1.4 TH/MM3  


 


Monocytes # (Auto) 1.4 TH/MM3  


 


Eosinophils # (Auto) 0.4 TH/MM3  


 


Basophils # (Auto) 0.0 TH/MM3  


 


CBC Comment DIFF FINAL  


 


Differential Comment   


 


Blood Urea Nitrogen 13 MG/DL  


 


Creatinine 0.82 MG/DL  


 


Random Glucose 107 MG/DL  


 


Total Protein 7.8 GM/DL  


 


Albumin 3.3 GM/DL  


 


Calcium Level 9.3 MG/DL  


 


Alkaline Phosphatase 161 U/L  


 


Aspartate Amino Transf


(AST/SGOT) 47 U/L 


  


 


 


Alanine Aminotransferase


(ALT/SGPT) 58 U/L 


  


 


 


Total Bilirubin 0.5 MG/DL  


 


Sodium Level 134 MEQ/L  


 


Potassium Level 4.0 MEQ/L  


 


Chloride Level 96 MEQ/L  


 


Carbon Dioxide Level 29.9 MEQ/L  


 


Anion Gap 8 MEQ/L  


 


Estimat Glomerular Filtration


Rate 67 ML/MIN 


  


 


 


Lactic Acid Level 0.8 mmol/L  


 


Urine Color  LIGHT-YELLOW 


 


Urine Turbidity  HAZY 


 


Urine pH  6.0 


 


Urine Specific Gravity  1.015 


 


Urine Protein  TRACE mg/dL 


 


Urine Glucose (UA)  NEG mg/dL 


 


Urine Ketones  TRACE mg/dL 


 


Urine Occult Blood  MOD 


 


Urine Nitrite  POS 


 


Urine Bilirubin  NEG 


 


Urine Urobilinogen


  


  LESS THAN 2.0


MG/DL


 


Urine Leukocyte Esterase  LARGE 


 


Urine RBC  30 /hpf 


 


Urine WBC  123 /hpf 


 


Urine WBC Clumps  FEW 


 


Urine Squamous Epithelial


Cells 


  11 /hpf 


 


 


Urine Amorphous Sediment  RARE 


 


Urine Bacteria  MANY /hpf 


 


Urine Mucus  MANY /lpf 


 


Microscopic Urinalysis Comment


  


  CATH-CULTURE


IND











MDM


Medical Decision Making


Medical Screen Exam Complete:  Yes


Emergency Medical Condition:  Yes


Interpretation(s)


LABS:


CBC remarkable for white count of 16.4 thousand


CMP is generally unremarkable, mild elevated AST and ALT.


UA positive for infection


Differential Diagnosis


UTI, infection, viral syndrome, wound infection, other


Narrative Course


Medical decision making 


77-year-old woman presents with fever, postop, and a Roblero catheter until 

couple days ago, has urinary tract infection.  This likely explains her 

symptoms.  Recommend antibiotics, outpatient follow-up.





Diagnosis





 Primary Impression:  


 Urinary tract infection


Patient Instructions:  General Instructions





***Additional Instructions:  


Take antibiotics as prescribed.





Follow with her primary doctor in the next 2-4 days.





Return to the emergency department for any new or worsening symptoms.


***Med/Other Pt SpecificInfo:  Prescription(s) given


Scripts


Cephalexin (Keflex) 500 Mg Capsule


500 MG PO Q8H for Infection for 7 Days, #21 CAP 0 Refills


   Prov: Mac Bonds MD         4/8/18


Disposition:  01 DISCHARGE HOME


Condition:  Stable











Mac Bonds MD Apr 8, 2018 19:02

## 2018-04-08 NOTE — RADRPT
EXAM DATE/TIME:  04/08/2018 18:00 

 

[facility] COMPARISON:     

No previous studies available for comparison.

 

                     

INDICATIONS :     

Fever, short of breath

Hysterectomy 1 week ago

                     

 

MEDICAL HISTORY :            

Carcinoma, breast. Arthritis. Anemia   

 

SURGICAL HISTORY :     

Hysterectomy.   Left breast lumpectomy

 

ENCOUNTER:     

Initial                                        

 

ACUITY:     

1 day      

 

PAIN SCORE:     

0/10

 

LOCATION:      

chest 

 

FINDINGS:     

A single view of the chest demonstrates the lungs to be symmetrically aerated without evidence of mas
s, infiltrate or effusion.  The cardiomediastinal contours are unremarkable.  Osseous structures are 
intact.

 

CONCLUSION:     

1. No active disease. Surgical clips in the left axillary region.

 

 

 

 Jean Paul Morris MD on April 08, 2018 at 18:10           

Board Certified Radiologist.

 This report was verified electronically.

## 2018-04-23 ENCOUNTER — HOSPITAL ENCOUNTER (OUTPATIENT)
Dept: HOSPITAL 17 - PLAB | Age: 78
End: 2018-04-23
Attending: FAMILY MEDICINE
Payer: MEDICARE

## 2018-04-23 DIAGNOSIS — N39.0: Primary | ICD-10-CM

## 2018-04-23 DIAGNOSIS — B96.20: ICD-10-CM

## 2018-04-23 LAB
BACTERIA #/AREA URNS HPF: (no result) /HPF
COLOR UR: YELLOW
GLUCOSE UR STRIP-MCNC: (no result) MG/DL
HGB UR QL STRIP: (no result)
HYALINE CASTS #/AREA URNS LPF: 2 /LPF
KETONES UR STRIP-MCNC: (no result) MG/DL
MUCOUS THREADS #/AREA URNS LPF: (no result) /LPF
NITRITE UR QL STRIP: (no result)
SP GR UR STRIP: 1.01 (ref 1–1.03)
SQUAMOUS #/AREA URNS HPF: 1 /HPF (ref 0–5)
TRANS CELLS #/AREA URNS HPF: <1 /HPF
URINE LEUKOCYTE ESTERASE: (no result)
WHITE BLOOD CELL CLUMPS: (no result)

## 2018-04-23 PROCEDURE — 87077 CULTURE AEROBIC IDENTIFY: CPT

## 2018-04-23 PROCEDURE — 87186 SC STD MICRODIL/AGAR DIL: CPT

## 2018-04-23 PROCEDURE — 87086 URINE CULTURE/COLONY COUNT: CPT

## 2018-04-23 PROCEDURE — 81001 URINALYSIS AUTO W/SCOPE: CPT

## 2018-05-09 ENCOUNTER — HOSPITAL ENCOUNTER (OUTPATIENT)
Dept: HOSPITAL 17 - PLAB | Age: 78
End: 2018-05-09
Attending: FAMILY MEDICINE
Payer: MEDICARE

## 2018-05-09 DIAGNOSIS — N39.0: Primary | ICD-10-CM

## 2018-05-09 DIAGNOSIS — B96.20: ICD-10-CM

## 2018-05-09 LAB
BACTERIA #/AREA URNS HPF: (no result) /HPF
COLOR UR: YELLOW
GLUCOSE UR STRIP-MCNC: (no result) MG/DL
HGB UR QL STRIP: (no result)
KETONES UR STRIP-MCNC: (no result) MG/DL
MUCOUS THREADS #/AREA URNS LPF: (no result) /LPF
NITRITE UR QL STRIP: (no result)
SP GR UR STRIP: 1.01 (ref 1–1.03)
SQUAMOUS #/AREA URNS HPF: 2 /HPF (ref 0–5)
URINE LEUKOCYTE ESTERASE: (no result)

## 2018-05-09 PROCEDURE — 87186 SC STD MICRODIL/AGAR DIL: CPT

## 2018-05-09 PROCEDURE — 81001 URINALYSIS AUTO W/SCOPE: CPT

## 2018-05-09 PROCEDURE — 87086 URINE CULTURE/COLONY COUNT: CPT

## 2018-05-09 PROCEDURE — 87077 CULTURE AEROBIC IDENTIFY: CPT

## 2018-05-21 ENCOUNTER — HOSPITAL ENCOUNTER (OUTPATIENT)
Dept: HOSPITAL 17 - PLAB | Age: 78
End: 2018-05-21
Attending: FAMILY MEDICINE
Payer: MEDICARE

## 2018-05-21 DIAGNOSIS — N39.0: Primary | ICD-10-CM

## 2018-05-21 DIAGNOSIS — B96.20: ICD-10-CM

## 2018-05-21 LAB
BACTERIA #/AREA URNS HPF: (no result) /HPF
COLOR UR: YELLOW
GLUCOSE UR STRIP-MCNC: (no result) MG/DL
HGB UR QL STRIP: (no result)
KETONES UR STRIP-MCNC: (no result) MG/DL
MUCOUS THREADS #/AREA URNS LPF: (no result) /LPF
NITRITE UR QL STRIP: (no result)
SP GR UR STRIP: 1.02 (ref 1–1.03)
SQUAMOUS #/AREA URNS HPF: 1 /HPF (ref 0–5)
URINE LEUKOCYTE ESTERASE: (no result)

## 2018-05-21 PROCEDURE — 87077 CULTURE AEROBIC IDENTIFY: CPT

## 2018-05-21 PROCEDURE — 87086 URINE CULTURE/COLONY COUNT: CPT

## 2018-05-21 PROCEDURE — 81001 URINALYSIS AUTO W/SCOPE: CPT

## 2018-05-21 PROCEDURE — 87186 SC STD MICRODIL/AGAR DIL: CPT
